# Patient Record
Sex: MALE | Race: WHITE | NOT HISPANIC OR LATINO | URBAN - NONMETROPOLITAN AREA
[De-identification: names, ages, dates, MRNs, and addresses within clinical notes are randomized per-mention and may not be internally consistent; named-entity substitution may affect disease eponyms.]

---

## 2019-04-29 ENCOUNTER — IMPORTED ENCOUNTER (OUTPATIENT)
Dept: URBAN - NONMETROPOLITAN AREA CLINIC 1 | Facility: CLINIC | Age: 44
End: 2019-04-29

## 2019-04-29 PROBLEM — H00.011: Noted: 2019-04-29

## 2019-04-29 PROCEDURE — 67700 BLEPHAROTOMY DRG ABSC EYELID: CPT

## 2019-04-29 NOTE — PATIENT DISCUSSION
External Hordeolum RUL-Recommend I&D. RBA's discussed with patient.-Pt elects to have procedure done today.-Start Doxy 50mg BID x 1 week then 50mg QD x 1 week. -RTC 2 weeks POV w/ Zully Shi

## 2019-05-13 ENCOUNTER — IMPORTED ENCOUNTER (OUTPATIENT)
Dept: URBAN - NONMETROPOLITAN AREA CLINIC 1 | Facility: CLINIC | Age: 44
End: 2019-05-13

## 2019-05-13 PROBLEM — H52.13: Noted: 2019-05-13

## 2019-05-13 PROBLEM — H52.223: Noted: 2019-05-13

## 2019-05-13 PROBLEM — H52.4: Noted: 2019-05-13

## 2019-05-13 PROCEDURE — 92014 COMPRE OPH EXAM EST PT 1/>: CPT

## 2019-05-13 PROCEDURE — 92015 DETERMINE REFRACTIVE STATE: CPT

## 2019-05-13 NOTE — PATIENT DISCUSSION
Compound Myopic Astigmatism OU w/Presbyopia-  discussed amy w/patient-  new spectacle Rx issued-  monitor yearly or prn s/p External Hordeolum RUL-  discussed findings w/patient-  stable and doing well-  monitor as scheduled or prn; 's Notes: MR 5/13/2019DFE defer 5/13/2019

## 2019-08-05 ENCOUNTER — IMPORTED ENCOUNTER (OUTPATIENT)
Dept: URBAN - NONMETROPOLITAN AREA CLINIC 1 | Facility: CLINIC | Age: 44
End: 2019-08-05

## 2019-08-05 PROCEDURE — 92012 INTRM OPH EXAM EST PATIENT: CPT

## 2019-08-05 NOTE — PATIENT DISCUSSION
Chalazion:-Recommend pt begin warm compresses and lid scrubs BID as well as artificial tears OU QID. Pt instructed on proper technique for lid scrubs. -R and B's if incision and drainage discussed with patient. Patient wishes to move forward with removal.Schedule next available.; 's Notes: MR 5/13/2019DFE defer 5/13/2019

## 2019-08-06 PROBLEM — H00.11: Noted: 2019-08-06

## 2019-08-09 ENCOUNTER — IMPORTED ENCOUNTER (OUTPATIENT)
Dept: URBAN - NONMETROPOLITAN AREA CLINIC 1 | Facility: CLINIC | Age: 44
End: 2019-08-09

## 2019-08-09 PROCEDURE — 67800 REMOVE EYELID LESION: CPT

## 2019-08-09 NOTE — PATIENT DISCUSSION
Chalazion:-Recommend pt begin warm compresses and lid scrubs BID as well as artificial tears OU QID. Pt instructed on proper technique for lid scrubs. -R and B's if incision and drainage discussed with patient. Patient wishes to move forward with removal today. 1-2 week PO w/ Dr. Pilo Snyder in [facility].; Dr's Notes: MR 5/13/2019DFE defer 5/13/2019

## 2019-08-13 PROBLEM — H00.11: Noted: 2019-08-13

## 2022-04-09 ASSESSMENT — VISUAL ACUITY
OU_SC: J1+
OD_CC: 20/40
OS_SC: 20/20-1
OS_CC: 20/50
OU_CC: 20/30
OD_SC: 20/25
OD_CC: 20/30
OS_CC: 20/50

## 2022-04-09 ASSESSMENT — TONOMETRY
OS_IOP_MMHG: 13
OD_IOP_MMHG: 13

## 2022-09-29 NOTE — PATIENT DISCUSSION
[facility] monitoring, AREDS2 vitamins, no smoking, green leafy vegetables discussed.

## 2022-09-29 NOTE — PATIENT DISCUSSION
Moderate Dry Eyes: Prescribed disappearing preservative or preservative-free artificial tears 4-6x per day OU and the daily intake of Omega-3 DHA/EPA fatty acids to help relieve symptoms. e or if symptoms persist. Return for follow-up as scheduled or sooner if symptoms persist.

## 2022-12-01 ENCOUNTER — NEW PATIENT (OUTPATIENT)
Dept: RURAL CLINIC 1 | Facility: CLINIC | Age: 47
End: 2022-12-01

## 2022-12-01 PROCEDURE — 92004 COMPRE OPH EXAM NEW PT 1/>: CPT

## 2022-12-01 PROCEDURE — 92015 DETERMINE REFRACTIVE STATE: CPT

## 2022-12-01 ASSESSMENT — VISUAL ACUITY
OD_CC: 20/40
OU_CC: 20/40
OU_SC: 20/30
OD_PH: 20/25
OS_PH: 20/25
OS_CC: 20/40

## 2022-12-01 ASSESSMENT — TONOMETRY
OD_IOP_MMHG: 14
OS_IOP_MMHG: 14